# Patient Record
Sex: MALE | Race: WHITE
[De-identification: names, ages, dates, MRNs, and addresses within clinical notes are randomized per-mention and may not be internally consistent; named-entity substitution may affect disease eponyms.]

---

## 2017-04-11 ENCOUNTER — HOSPITAL ENCOUNTER (OUTPATIENT)
Dept: HOSPITAL 19 - COL.RAD | Age: 72
End: 2017-04-11
Attending: UROLOGY
Payer: MEDICARE

## 2017-04-11 DIAGNOSIS — C61: Primary | ICD-10-CM

## 2017-04-11 DIAGNOSIS — Z95.1: ICD-10-CM

## 2017-04-11 PROCEDURE — A9503 TC99M MEDRONATE: HCPCS

## 2017-11-01 ENCOUNTER — HOSPITAL ENCOUNTER (OUTPATIENT)
Dept: HOSPITAL 19 - MKS.ESL.PT | Age: 72
LOS: 1 days | End: 2017-11-02
Attending: ORTHOPAEDIC SURGERY
Payer: MEDICARE

## 2017-11-01 DIAGNOSIS — Z01.818: Primary | ICD-10-CM

## 2017-11-01 DIAGNOSIS — M25.512: ICD-10-CM

## 2018-02-06 ENCOUNTER — HOSPITAL ENCOUNTER (OUTPATIENT)
Dept: HOSPITAL 19 - MKS.ESL.PT | Age: 73
LOS: 6 days | Discharge: HOME | End: 2018-02-12
Attending: ORTHOPAEDIC SURGERY
Payer: MEDICARE

## 2018-02-06 DIAGNOSIS — M25.532: ICD-10-CM

## 2018-02-06 DIAGNOSIS — Z98.890: ICD-10-CM

## 2018-02-06 DIAGNOSIS — Z47.89: Primary | ICD-10-CM

## 2018-04-03 ENCOUNTER — HOSPITAL ENCOUNTER (OUTPATIENT)
Dept: HOSPITAL 19 - MKS.ESL.PT | Age: 73
LOS: 1 days | Discharge: HOME | End: 2018-04-04
Attending: ORTHOPAEDIC SURGERY
Payer: MEDICARE

## 2018-04-03 DIAGNOSIS — Z98.890: ICD-10-CM

## 2018-04-03 DIAGNOSIS — Z47.89: Primary | ICD-10-CM

## 2022-06-02 ENCOUNTER — HOSPITAL ENCOUNTER (OUTPATIENT)
Dept: HOSPITAL 19 - COL.CAR | Age: 77
Discharge: HOME | End: 2022-06-02
Attending: INTERNAL MEDICINE
Payer: MEDICARE

## 2022-06-02 VITALS — HEART RATE: 61 BPM | SYSTOLIC BLOOD PRESSURE: 115 MMHG | DIASTOLIC BLOOD PRESSURE: 66 MMHG

## 2022-06-02 VITALS — HEART RATE: 62 BPM | SYSTOLIC BLOOD PRESSURE: 114 MMHG | DIASTOLIC BLOOD PRESSURE: 66 MMHG

## 2022-06-02 VITALS — HEART RATE: 66 BPM | DIASTOLIC BLOOD PRESSURE: 77 MMHG | SYSTOLIC BLOOD PRESSURE: 116 MMHG | TEMPERATURE: 97.7 F

## 2022-06-02 VITALS — DIASTOLIC BLOOD PRESSURE: 78 MMHG | SYSTOLIC BLOOD PRESSURE: 140 MMHG | HEART RATE: 61 BPM

## 2022-06-02 VITALS — SYSTOLIC BLOOD PRESSURE: 119 MMHG | DIASTOLIC BLOOD PRESSURE: 72 MMHG | HEART RATE: 68 BPM

## 2022-06-02 VITALS — HEART RATE: 59 BPM | SYSTOLIC BLOOD PRESSURE: 125 MMHG | DIASTOLIC BLOOD PRESSURE: 67 MMHG

## 2022-06-02 VITALS — SYSTOLIC BLOOD PRESSURE: 125 MMHG | HEART RATE: 61 BPM | DIASTOLIC BLOOD PRESSURE: 68 MMHG

## 2022-06-02 VITALS — BODY MASS INDEX: 30.27 KG/M2 | WEIGHT: 199.74 LBS | HEIGHT: 67.99 IN

## 2022-06-02 VITALS — SYSTOLIC BLOOD PRESSURE: 126 MMHG | HEART RATE: 62 BPM | DIASTOLIC BLOOD PRESSURE: 80 MMHG

## 2022-06-02 VITALS — DIASTOLIC BLOOD PRESSURE: 81 MMHG | HEART RATE: 61 BPM | SYSTOLIC BLOOD PRESSURE: 127 MMHG

## 2022-06-02 VITALS — DIASTOLIC BLOOD PRESSURE: 80 MMHG | SYSTOLIC BLOOD PRESSURE: 120 MMHG | HEART RATE: 65 BPM

## 2022-06-02 VITALS — HEART RATE: 62 BPM | DIASTOLIC BLOOD PRESSURE: 80 MMHG | SYSTOLIC BLOOD PRESSURE: 130 MMHG

## 2022-06-02 VITALS — SYSTOLIC BLOOD PRESSURE: 129 MMHG | HEART RATE: 63 BPM | DIASTOLIC BLOOD PRESSURE: 76 MMHG

## 2022-06-02 VITALS — SYSTOLIC BLOOD PRESSURE: 117 MMHG | HEART RATE: 62 BPM | DIASTOLIC BLOOD PRESSURE: 75 MMHG

## 2022-06-02 DIAGNOSIS — R07.9: Primary | ICD-10-CM

## 2022-06-02 DIAGNOSIS — I25.10: ICD-10-CM

## 2022-06-02 LAB
ANION GAP SERPL CALC-SCNC: 10 MMOL/L (ref 7–16)
APTT PPP: 40.5 SECONDS (ref 26–37)
BUN SERPL-MCNC: 13 MG/DL (ref 8–26)
CALCIUM SERPL-MCNC: 9.5 MG/DL (ref 8.4–10.2)
CHLORIDE SERPL-SCNC: 105 MMOL/L (ref 98–107)
CO2 SERPL-SCNC: 27 MMOL/L (ref 23–31)
CREAT SERPL-SCNC: 0.94 MG/DL (ref 0.72–1.25)
ERYTHROCYTE [DISTWIDTH] IN BLOOD BY AUTOMATED COUNT: 13.2 % (ref 11.5–14.5)
GLUCOSE SERPL-MCNC: 92 MG/DL (ref 70–99)
HCT VFR BLD AUTO: 43.6 % (ref 42–52)
HGB BLD-MCNC: 16 G/DL (ref 13.5–18)
INR BLD: 1.2 (ref 0.8–3)
MCH RBC QN AUTO: 36 PG (ref 27–31)
MCHC RBC AUTO-ENTMCNC: 37 G/DL (ref 33–37)
MCV RBC AUTO: 97 FL (ref 80–100)
PLATELET # BLD AUTO: 162 K/MM3 (ref 130–400)
PMV BLD AUTO: 10 FL (ref 7.4–10.4)
POTASSIUM SERPL-SCNC: 4.6 MMOL/L (ref 3.5–4.5)
PROTHROMBIN TIME: 13.3 SECONDS (ref 9.7–12.8)
RBC # BLD AUTO: 4.5 M/MM3 (ref 4.2–5.6)
SODIUM SERPL-SCNC: 142 MMOL/L (ref 136–145)

## 2022-06-02 PROCEDURE — C1769 GUIDE WIRE: HCPCS

## 2022-06-02 PROCEDURE — C1760 CLOSURE DEV, VASC: HCPCS

## 2022-06-02 PROCEDURE — C1894 INTRO/SHEATH, NON-LASER: HCPCS

## 2022-06-02 NOTE — NUR
Pt is back from cath lab, bs report from Ralph CONLEY.  Pt is awake and alert,
relaxed, pwd, sinus edi on monitor.  Rt groin site is soft, with cms intact
distal, dressing clean and dry.  Pt and  updated on poc, lunch ordered.
call light in [facility].

## 2022-06-02 NOTE — NUR
Bedrest complete.  Pt is up and ambulatory in nurses station to bathroom with
steady gait, no dizziness.  rt groin site remains soft without evidence of
bleeding.  pt was medicated for headache earlier with telephone order for
tylenol from Dr. Barba.  Headache is improved at this time.  I have spent
timein room reviewing dc/fu instructions with pt and wife.  They both deny
questions at this time.  IV dc'd with cath intact, dressing applied.  Pt
escorted to exit via wheelchair.  Wife is pt's transporation home this
evening.

## 2022-06-02 NOTE — NUR
RECOVERY GOING WELL, PT WITH HOB 30DEG AT THIS TIME TO FACILITATE LUNCH.  NO
PROBLEMS OBSERVED AT RT GROIN SITE, CMS INTACT DISTAL, SITE SOFT, DRESSING
CLEAN AND DRY.  WIFE AT BS.  BOTH DENY NEEDS AT THIS TIME.

## 2022-06-27 ENCOUNTER — HOSPITAL ENCOUNTER (OUTPATIENT)
Dept: HOSPITAL 19 - COL.RAD | Age: 77
End: 2022-06-27
Attending: UROLOGY
Payer: MEDICARE

## 2022-06-27 DIAGNOSIS — Z13.89: Primary | ICD-10-CM

## 2022-06-27 DIAGNOSIS — R97.21: ICD-10-CM

## 2024-08-05 ENCOUNTER — HOSPITAL ENCOUNTER (OUTPATIENT)
Dept: HOSPITAL 19 - SDCO | Age: 79
LOS: 1 days | Discharge: HOME | End: 2024-08-06
Attending: ORTHOPAEDIC SURGERY
Payer: MEDICARE

## 2024-08-05 VITALS — DIASTOLIC BLOOD PRESSURE: 77 MMHG | HEART RATE: 66 BPM | TEMPERATURE: 97.5 F | SYSTOLIC BLOOD PRESSURE: 133 MMHG

## 2024-08-05 VITALS — DIASTOLIC BLOOD PRESSURE: 67 MMHG | HEART RATE: 66 BPM | SYSTOLIC BLOOD PRESSURE: 116 MMHG

## 2024-08-05 VITALS — TEMPERATURE: 97.6 F | SYSTOLIC BLOOD PRESSURE: 131 MMHG | HEART RATE: 75 BPM | DIASTOLIC BLOOD PRESSURE: 62 MMHG

## 2024-08-05 VITALS — SYSTOLIC BLOOD PRESSURE: 132 MMHG | HEART RATE: 65 BPM | DIASTOLIC BLOOD PRESSURE: 65 MMHG

## 2024-08-05 VITALS — SYSTOLIC BLOOD PRESSURE: 114 MMHG | HEART RATE: 66 BPM | DIASTOLIC BLOOD PRESSURE: 62 MMHG

## 2024-08-05 VITALS — HEART RATE: 71 BPM | SYSTOLIC BLOOD PRESSURE: 129 MMHG | DIASTOLIC BLOOD PRESSURE: 75 MMHG | TEMPERATURE: 97.4 F

## 2024-08-05 VITALS — DIASTOLIC BLOOD PRESSURE: 62 MMHG | SYSTOLIC BLOOD PRESSURE: 117 MMHG | HEART RATE: 78 BPM

## 2024-08-05 VITALS — HEART RATE: 80 BPM | SYSTOLIC BLOOD PRESSURE: 114 MMHG | TEMPERATURE: 97.7 F | DIASTOLIC BLOOD PRESSURE: 63 MMHG

## 2024-08-05 VITALS — DIASTOLIC BLOOD PRESSURE: 54 MMHG | TEMPERATURE: 98 F | SYSTOLIC BLOOD PRESSURE: 125 MMHG | HEART RATE: 66 BPM

## 2024-08-05 VITALS — HEIGHT: 68 IN | WEIGHT: 186.73 LBS | BODY MASS INDEX: 28.3 KG/M2

## 2024-08-05 VITALS — HEART RATE: 74 BPM | DIASTOLIC BLOOD PRESSURE: 65 MMHG | SYSTOLIC BLOOD PRESSURE: 124 MMHG

## 2024-08-05 VITALS — SYSTOLIC BLOOD PRESSURE: 122 MMHG | HEART RATE: 68 BPM | DIASTOLIC BLOOD PRESSURE: 60 MMHG

## 2024-08-05 VITALS — SYSTOLIC BLOOD PRESSURE: 127 MMHG | DIASTOLIC BLOOD PRESSURE: 61 MMHG | HEART RATE: 68 BPM

## 2024-08-05 DIAGNOSIS — M17.12: Primary | ICD-10-CM

## 2024-08-05 PROCEDURE — C1776 JOINT DEVICE (IMPLANTABLE): HCPCS

## 2024-08-05 PROCEDURE — C1713 ANCHOR/SCREW BN/BN,TIS/BN: HCPCS

## 2024-08-05 PROCEDURE — A9284 NON-ELECTRONIC SPIROMETER: HCPCS

## 2024-08-05 NOTE — NUR
CALL PLACED TO [facility] CRNA FOR ANESTHESIA CONSENT AND FLUID ORDERS. SEE
ORDERS, CONSENT FOR GENERAL ANESTHESIA & MAJOR/MINOR NERVE
BLOCK WITH SEDATION AND FOR LACTATED RINGERS PER PROVIDER.

## 2024-08-05 NOTE — NUR
report received from georgette rangel. pt resting in recliner with wife at bedside. pt
reports 2/10 left knee pain but refuses PRN. call light in reach. all needs
met at this time.

## 2024-08-05 NOTE — NUR
PATIENT BROUGHT TO FLOOR AT APPROXIMATELY 1045. POST OP VITALS RUNNING AND
WNL. POST OP FLUIDS INFUSING INTO IV IN RIGHT HAND. DRESSING CDI WITH ACE
WRAP. HEMOVAC TO COMPRESSION. PATIENT REPORTS PAIN IS NONEXISTENT. PATIENT
TOLERATING SIPS OF WATER. NO FURTHER NEEDS. CALL LIGHT IN REACH.

## 2024-08-05 NOTE — NUR
shift assessment complete, see documentation. pt reporting 2/10 left knee pain
and requesting ice. new ice bag placed. pt tolerated pain meds well. call
light in reach. all needs met at this time.

## 2024-08-05 NOTE — NUR
PATIENT ABLE TO VOID, TOLERATING PO AND HAS ADEQUATE PO INTAKE. IV INT.
PATIENT DENIES ANY NAUSEA. CALL LIGHT IN REACH.

## 2024-08-05 NOTE — NUR
PATIENT REPORTS PAIN IS MILD, DENIES NEED FOR NARCOTICS. SCHEDULED TYLENOL
AND TORADOL ADMINISTERED. HEMOVAC WITH 60ML DRAINAGE THIS SHIFT. CALL LIGHT IN
REACH.

## 2024-08-06 VITALS — SYSTOLIC BLOOD PRESSURE: 113 MMHG | TEMPERATURE: 98 F | HEART RATE: 73 BPM | DIASTOLIC BLOOD PRESSURE: 61 MMHG

## 2024-08-06 VITALS — DIASTOLIC BLOOD PRESSURE: 55 MMHG | SYSTOLIC BLOOD PRESSURE: 117 MMHG | TEMPERATURE: 97.7 F | HEART RATE: 82 BPM

## 2024-08-06 VITALS — SYSTOLIC BLOOD PRESSURE: 117 MMHG

## 2024-08-06 VITALS — SYSTOLIC BLOOD PRESSURE: 113 MMHG

## 2024-08-06 NOTE — NUR
D:   stopped by room on rounds.
 
A: Pt was resting and content.  Pt has no needs right now.
 
P:   informed pt that if he needed anything from the  area to
let his nurse know.   will follow up as needed.

## 2024-08-06 NOTE — NUR
discharge instructions given to pt, all questions answered. printed rx scripts
copied and original sent with pt. pt escorted to personal vehicle for
discharge.

## 2024-08-06 NOTE — NUR
pt a&ox4 sitting up in recliner eating breakfast. vss. pt rates pain a 2/10 in
the left knee. dressing is cdi. teds to ble. INT to right ac patent. meds
given and assessment complete. pt denies needs at this time. call light in
reach.

## 2024-08-06 NOTE — NUR
met with patient to discuss discharge planning. Patient lives in
[place] with his wife, Renate (ph#[phone]) and sees Dr. Arauz for
primary care. Patient gets his medications from [facility] on [address] with no
difficulties and does not normally use any DME, however does have a walker
available for recovery. Patient is normally independent with ADLS, including
driving and has outpatient PT set up at [facility] starting
tomorrow. SW faxed discharge orders.
 
Discharge Plan: Home, Outpatient OP